# Patient Record
Sex: MALE | Race: WHITE | NOT HISPANIC OR LATINO | Employment: OTHER | URBAN - METROPOLITAN AREA
[De-identification: names, ages, dates, MRNs, and addresses within clinical notes are randomized per-mention and may not be internally consistent; named-entity substitution may affect disease eponyms.]

---

## 2017-01-10 ENCOUNTER — HOSPITAL ENCOUNTER (OUTPATIENT)
Dept: RADIOLOGY | Facility: HOSPITAL | Age: 72
Discharge: HOME | End: 2017-01-10
Attending: THORACIC SURGERY (CARDIOTHORACIC VASCULAR SURGERY)
Payer: MEDICARE

## 2017-01-10 DIAGNOSIS — I65.29 OCCLUSION AND STENOSIS OF CAROTID ARTERY WITHOUT MENTION OF CEREBRAL INFARCTION: ICD-10-CM

## 2017-01-10 DIAGNOSIS — I71.40 ABDOMINAL AORTIC ANEURYSM WITHOUT RUPTURE: ICD-10-CM

## 2017-01-10 PROCEDURE — 76775 US EXAM ABDO BACK WALL LIM: CPT | Mod: TC,PO

## 2017-01-10 PROCEDURE — 76775 US EXAM ABDO BACK WALL LIM: CPT | Mod: 26,,, | Performed by: RADIOLOGY

## 2017-01-10 PROCEDURE — 93880 EXTRACRANIAL BILAT STUDY: CPT | Mod: TC,PO

## 2017-01-10 PROCEDURE — 93880 EXTRACRANIAL BILAT STUDY: CPT | Mod: 26,,, | Performed by: RADIOLOGY

## 2017-01-24 ENCOUNTER — OFFICE VISIT (OUTPATIENT)
Dept: VASCULAR SURGERY | Facility: CLINIC | Age: 72
End: 2017-01-24
Payer: MEDICARE

## 2017-01-24 VITALS
DIASTOLIC BLOOD PRESSURE: 68 MMHG | BODY MASS INDEX: 25.25 KG/M2 | WEIGHT: 176.38 LBS | HEIGHT: 70 IN | HEART RATE: 57 BPM | SYSTOLIC BLOOD PRESSURE: 139 MMHG

## 2017-01-24 DIAGNOSIS — I65.22 STENOSIS OF LEFT CAROTID ARTERY: ICD-10-CM

## 2017-01-24 DIAGNOSIS — I10 ESSENTIAL HYPERTENSION: ICD-10-CM

## 2017-01-24 DIAGNOSIS — I71.40 ABDOMINAL AORTIC ANEURYSM (AAA) WITHOUT RUPTURE: Primary | ICD-10-CM

## 2017-01-24 PROCEDURE — 1157F ADVNC CARE PLAN IN RCRD: CPT | Mod: S$GLB,,, | Performed by: THORACIC SURGERY (CARDIOTHORACIC VASCULAR SURGERY)

## 2017-01-24 PROCEDURE — 3078F DIAST BP <80 MM HG: CPT | Mod: S$GLB,,, | Performed by: THORACIC SURGERY (CARDIOTHORACIC VASCULAR SURGERY)

## 2017-01-24 PROCEDURE — 99213 OFFICE O/P EST LOW 20 MIN: CPT | Mod: S$GLB,,, | Performed by: THORACIC SURGERY (CARDIOTHORACIC VASCULAR SURGERY)

## 2017-01-24 PROCEDURE — 1159F MED LIST DOCD IN RCRD: CPT | Mod: S$GLB,,, | Performed by: THORACIC SURGERY (CARDIOTHORACIC VASCULAR SURGERY)

## 2017-01-24 PROCEDURE — 99999 PR PBB SHADOW E&M-EST. PATIENT-LVL III: CPT | Mod: PBBFAC,,, | Performed by: THORACIC SURGERY (CARDIOTHORACIC VASCULAR SURGERY)

## 2017-01-24 PROCEDURE — 1160F RVW MEDS BY RX/DR IN RCRD: CPT | Mod: S$GLB,,, | Performed by: THORACIC SURGERY (CARDIOTHORACIC VASCULAR SURGERY)

## 2017-01-24 PROCEDURE — 3075F SYST BP GE 130 - 139MM HG: CPT | Mod: S$GLB,,, | Performed by: THORACIC SURGERY (CARDIOTHORACIC VASCULAR SURGERY)

## 2017-01-24 RX ORDER — OMEPRAZOLE 20 MG/1
20 TABLET, DELAYED RELEASE ORAL
COMMUNITY
End: 2020-02-04

## 2017-01-24 RX ORDER — HYDROCODONE BITARTRATE AND ACETAMINOPHEN 10; 325 MG/1; MG/1
1 TABLET ORAL 2 TIMES DAILY
Refills: 0 | COMMUNITY
Start: 2017-01-03

## 2017-01-24 RX ORDER — DICLOFENAC SODIUM 75 MG/1
TABLET, DELAYED RELEASE ORAL
Refills: 0 | COMMUNITY
Start: 2017-01-20 | End: 2018-02-20

## 2017-01-24 RX ORDER — METOPROLOL SUCCINATE 25 MG/1
25 TABLET, EXTENDED RELEASE ORAL
COMMUNITY
End: 2020-02-04

## 2017-01-24 RX ORDER — BACLOFEN 10 MG/1
TABLET ORAL
Refills: 0 | COMMUNITY
Start: 2017-01-16

## 2017-01-24 NOTE — MR AVS SNAPSHOT
[facility]  [address] [facility] [address]  Phone: [phone]                  Melvin Camargo   2017 11:45 AM   Office Visit    Description:  Male : 1945   Provider:  Julian Deshpande MD   [facility]           Reason for Visit     Carotid Artery Disease     AAA           Diagnoses this Visit        Comments    Abdominal aortic aneurysm (AAA) without rupture    -  Primary     Stenosis of left carotid artery         Essential hypertension                To Do List           Goals (5 Years of Data)     None      Follow-Up and Disposition     Return in about 1 year (around 2018).      [facility]     [facility] Care Line -  Assistance  Registered nurses in the [facility] On Call Center provide clinical advisement, health education, appointment booking, and other advisory services.  Call for this free service at [phone].             Medications           Message regarding Medications     Verify the changes and/or additions to your medication regime listed below are the same as discussed with your clinician today.  If any of these changes or additions are incorrect, please notify your healthcare provider.             Verify that the below list of medications is an accurate representation of the medications you are currently taking.  If none reported, the list may be blank. If incorrect, please contact your healthcare provider. Carry this list with you in case of emergency.           Current Medications     aspirin (ECOTRIN) 81 MG EC tablet Take 81 mg by mouth once daily.      atorvastatin (LIPITOR) 40 MG tablet     baclofen (LIORESAL) 10 MG tablet     cyclobenzaprine (FLEXERIL) 10 MG tablet Take 10 mg by mouth every evening.    diazepam (VALIUM) 10 MG Tab     diclofenac (VOLTAREN) 75 MG EC tablet     etodolac (LODINE) 400 MG tablet Take 400 mg by mouth 2 (two) times daily.    ferrous sulfate 325 mg (65 mg  "iron) Tab tablet     hydrocodone-acetaminophen 10-325mg (NORCO)  mg Tab Take 1 tablet by mouth 2 (two) times daily.    lisinopril (PRINIVIL,ZESTRIL) 40 MG tablet Take 40 mg by mouth once daily.      lorazepam (ATIVAN) 1 MG tablet     metoprolol succinate (TOPROL-XL) 25 MG 24 hr tablet Take 25 mg by mouth.    metoprolol tartrate (LOPRESSOR) 25 MG tablet Take 12.5 mg by mouth 2 (two) times daily.    nifedipine (NIFEDIAC CC) 90 MG TbSR Take 30 mg by mouth once daily.      omeprazole (PRILOSEC OTC) 20 MG tablet Take 20 mg by mouth.    omeprazole (PRILOSEC) 20 MG capsule Take 20 mg by mouth once daily.      quetiapine (SEROQUEL) 25 MG Tab     terazosin (HYTRIN) 5 MG capsule Take 5 mg by mouth every evening.             Clinical Reference Information           Vital Signs - Last Recorded  Most recent update: 1/24/2017 12:06 PM by Aida Guzman MA    BP Pulse Ht Wt BMI    139/68 (!) 57 5' 10" (1.778 m) 80 kg (176 lb 6.4 oz) 25.31 kg/m2      Blood Pressure          Most Recent Value    BP  139/68      Allergies as of 1/24/2017     No Known Allergies      Immunizations Administered on Date of Encounter - 1/24/2017     None      Orders Placed During Today's Visit     Future Labs/Procedures Expected by Expires    US Abdominal Aorta  1/24/2018 8/24/2018    US Carotid Bilateral  1/24/2018 8/24/2018      [facility] Sign-Up     Activating your [facility] account is as easy as 1-2-3!     1) Visit [email or web address], select Sign Up Now, enter this activation code and your date of birth, then select Next.  [email or web address]  Expires: 3/10/2017  4:16 PM      2) Create a username and password to use when you visit [facility] in the future and select a security question in case you lose your password and select Next.    3) Enter your e-mail address and click Sign Up!    Additional Information  If you have questions, please e-mail [email or web address] or call [phone] to talk to our [facility] staff. Remember, [facility] is NOT to be " used for urgent needs. For medical emergencies, dial 911.

## 2017-01-24 NOTE — PROGRESS NOTES
CLINIC NOTE    Mr. Camargo is a 71-year-old white male with history of carotid artery stenosis   and abdominal aortic aneurysm.  He has no lateralizing neurologic complaints and   no amaurosis fugax.  He denies claudication and abdominal pain.  He has had   some left hip pain radiating into the left leg, which was felt by other   physicians be secondary to a problem with the nerve in his lower back.    PAST MEDICAL AND SURGICAL HISTORY:  Reviewed.    MEDICATIONS:  Aspirin, Flexeril, Prinivil, Seroquel, Hytrin, nifedipine.    ALLERGIES:  None.    REVIEW OF SYSTEMS:  As described above.    PHYSICAL EXAMINATION:  VITAL SIGNS:  Blood pressure 139/68, heart rate 57, weight 176.4 kg.  GENERAL:  Well-nourished, well-developed man, in no obvious distress.  HEENT:  Normocephalic, atraumatic.  There is good facial symmetry.  NECK:  Trachea is midline.  There are no carotid bruits.  CHEST:  Lungs are clear bilaterally.  HEART:  Regular rate and rhythm.  No rubs.  No murmurs.  ABDOMEN:  Soft.  Aortic pulsation is noted.  There are no abdominal bruits.    Bowel sounds are normal.  EXTREMITIES:  No cyanosis, clubbing or edema.  VASCULAR:  2+ radial and dorsalis pedis pulses bilaterally.  NEUROLOGIC:  Alert and oriented x4 with no focal deficits.    STUDIES:  Carotid ultrasound from 01/10/2017 reveals approximately 50% left   internal carotid artery stenosis and less than that on the right.  Ultrasound of   the abdominal aorta from 01/10/2017 reveals minimal increase in his aneurysm as   compared to that of April 2014.  It is only increased from 4.3 to 4.5 x 4.7 cm.    IMPRESSION:  1.  Small asymptomatic infrarenal abdominal aortic aneurysm without rupture.  2.  Left internal carotid artery stenosis that is asymptomatic.  3.  Hypertension.    PLAN:  Continue current medical management.  Follow with me in one year with   ultrasound of the abdominal aorta and ultrasound of the carotid arteries.      JOAN/MICHAEL  dd: 01/24/2017 16:14:41  (CST)  td: 01/24/2017 19:42:15 (CST)  Doc ID   #[id number]  Job ID #[id number]    CC:

## 2018-01-25 ENCOUNTER — TELEPHONE (OUTPATIENT)
Dept: VASCULAR SURGERY | Facility: CLINIC | Age: 73
End: 2018-01-25

## 2018-01-25 NOTE — TELEPHONE ENCOUNTER
----- Message from Faith Celaya sent at 1/25/2018  3:58 PM CST -----  Contact: Melvin  Patient is calling as received letter to schedule test and appointment. Please call [phone] and if calling tomorrow will not be available until after 1. Thanks!

## 2018-02-15 ENCOUNTER — TELEPHONE (OUTPATIENT)
Dept: RADIOLOGY | Facility: HOSPITAL | Age: 73
End: 2018-02-15

## 2018-02-16 ENCOUNTER — HOSPITAL ENCOUNTER (OUTPATIENT)
Dept: RADIOLOGY | Facility: HOSPITAL | Age: 73
Discharge: HOME | End: 2018-02-16
Attending: THORACIC SURGERY (CARDIOTHORACIC VASCULAR SURGERY)
Payer: MEDICARE

## 2018-02-16 DIAGNOSIS — I10 ESSENTIAL HYPERTENSION: ICD-10-CM

## 2018-02-16 DIAGNOSIS — I65.22 STENOSIS OF LEFT CAROTID ARTERY: ICD-10-CM

## 2018-02-16 DIAGNOSIS — I71.40 ABDOMINAL AORTIC ANEURYSM (AAA) WITHOUT RUPTURE: ICD-10-CM

## 2018-02-16 PROCEDURE — 76775 US EXAM ABDO BACK WALL LIM: CPT | Mod: 26,,, | Performed by: RADIOLOGY

## 2018-02-16 PROCEDURE — 76775 US EXAM ABDO BACK WALL LIM: CPT | Mod: TC,PO

## 2018-02-16 PROCEDURE — 93880 EXTRACRANIAL BILAT STUDY: CPT | Mod: 26,,, | Performed by: RADIOLOGY

## 2018-02-16 PROCEDURE — 93880 EXTRACRANIAL BILAT STUDY: CPT | Mod: TC,PO

## 2018-02-20 ENCOUNTER — OFFICE VISIT (OUTPATIENT)
Dept: VASCULAR SURGERY | Facility: CLINIC | Age: 73
End: 2018-02-20
Payer: MEDICARE

## 2018-02-20 VITALS
BODY MASS INDEX: 25.5 KG/M2 | WEIGHT: 178.13 LBS | SYSTOLIC BLOOD PRESSURE: 144 MMHG | DIASTOLIC BLOOD PRESSURE: 67 MMHG | HEART RATE: 65 BPM | HEIGHT: 70 IN

## 2018-02-20 DIAGNOSIS — I71.40 ABDOMINAL AORTIC ANEURYSM (AAA) WITHOUT RUPTURE: Primary | ICD-10-CM

## 2018-02-20 DIAGNOSIS — I65.22 STENOSIS OF LEFT CAROTID ARTERY: ICD-10-CM

## 2018-02-20 PROCEDURE — 3008F BODY MASS INDEX DOCD: CPT | Mod: S$GLB,,, | Performed by: THORACIC SURGERY (CARDIOTHORACIC VASCULAR SURGERY)

## 2018-02-20 PROCEDURE — 1159F MED LIST DOCD IN RCRD: CPT | Mod: S$GLB,,, | Performed by: THORACIC SURGERY (CARDIOTHORACIC VASCULAR SURGERY)

## 2018-02-20 PROCEDURE — 99213 OFFICE O/P EST LOW 20 MIN: CPT | Mod: S$GLB,,, | Performed by: THORACIC SURGERY (CARDIOTHORACIC VASCULAR SURGERY)

## 2018-02-20 PROCEDURE — 1125F AMNT PAIN NOTED PAIN PRSNT: CPT | Mod: S$GLB,,, | Performed by: THORACIC SURGERY (CARDIOTHORACIC VASCULAR SURGERY)

## 2018-02-20 PROCEDURE — 99999 PR PBB SHADOW E&M-EST. PATIENT-LVL III: CPT | Mod: PBBFAC,,, | Performed by: THORACIC SURGERY (CARDIOTHORACIC VASCULAR SURGERY)

## 2018-02-20 RX ORDER — PAROXETINE 10 MG/1
TABLET, FILM COATED ORAL
COMMUNITY
Start: 2018-01-18 | End: 2020-02-04

## 2018-02-20 RX ORDER — PREGABALIN 75 MG/1
75 CAPSULE ORAL 2 TIMES DAILY
COMMUNITY
End: 2020-02-04

## 2018-02-20 RX ORDER — LEUPROLIDE ACETATE 22.5 MG
KIT INTRAMUSCULAR
COMMUNITY
Start: 2018-02-19 | End: 2020-02-04

## 2018-02-20 NOTE — PROGRESS NOTES
CLINIC NOTE    HISTORY OF PRESENT ILLNESS:  Mr. Camargo is a 72-year-old gentleman with   asymptomatic carotid artery stenosis and asymptomatic infrarenal abdominal   aortic aneurysm.  He has had no lateralizing neurologic deficits aside from   lower back nerve related pains in the left leg.  He also has had no symptoms   from his abdominal aorta.  He has undergone back surgery since his last visit   with me.    MEDICATIONS:  Reviewed and include aspirin daily.    ALLERGIES:  None.    REVIEW OF SYSTEMS:  Negative for lateralizing neurologic complaints, amaurosis   fugax, or significant abdominal pain or claudication.    PHYSICAL EXAMINATION:  VITAL SIGNS:  Blood pressure 144/67, heart rate 65, weight 80.8 kg.  GENERAL:  He appears well.  CHEST:  Lungs are clear bilaterally.  HEART:  Regular rate and rhythm.  ABDOMEN:  The pulsatility of the aorta is noted.  There is no bruit.  EXTREMITIES:  No edema.  VASCULAR:  2+ radial, femoral, and popliteal pulses bilaterally.  NECK:  Carotid arteries are without bruits.  NEUROLOGIC:  Alert and oriented x4 with no focal deficits.  Tongue protrudes in   midline.    STUDIES:  Carotid ultrasound from 02/16/2018 reveals on the right a peak   systolic velocity of 116 with a ratio of 1.3 and on the left the velocity of 150   with a ratio of 1.4.  This suggests a 50-69% carotid stenosis, which is   unchanged from previous findings.  Ultrasound of the abdominal aorta from   02/16/2018 is compared to that of 01/10/2017.  There is a 4.5 x 4.7 cm   infrarenal abdominal aortic aneurysm that is unchanged from the previous year.    IMPRESSION:  1.  Bilateral internal carotid artery stenosis, left greater than right with no   symptoms.  2.  Infrarenal abdominal aortic aneurysm without rupture and without increase in   size.    PLAN:  Continue current medical management.  Follow with me in one year with   carotid ultrasound and ultrasound of the abdominal aorta.      JOAN/ARTURO  dd: 02/20/2018  10:22:58 (CST)  td: 02/21/2018 00:25:14 (NITIN)  Doc ID   #[id number]  Job ID #[id number]    CC:

## 2019-03-05 ENCOUNTER — HOSPITAL ENCOUNTER (OUTPATIENT)
Dept: RADIOLOGY | Facility: HOSPITAL | Age: 74
Discharge: HOME | End: 2019-03-05
Attending: THORACIC SURGERY (CARDIOTHORACIC VASCULAR SURGERY)
Payer: MEDICARE

## 2019-03-05 DIAGNOSIS — I71.40 ABDOMINAL AORTIC ANEURYSM (AAA) WITHOUT RUPTURE: ICD-10-CM

## 2019-03-05 DIAGNOSIS — I65.22 STENOSIS OF LEFT CAROTID ARTERY: ICD-10-CM

## 2019-03-05 PROCEDURE — 93880 US CAROTID BILATERAL: ICD-10-PCS | Mod: 26,,, | Performed by: RADIOLOGY

## 2019-03-05 PROCEDURE — 76775 US ABDOMINAL AORTA: ICD-10-PCS | Mod: 26,,, | Performed by: RADIOLOGY

## 2019-03-05 PROCEDURE — 93880 EXTRACRANIAL BILAT STUDY: CPT | Mod: TC,PO

## 2019-03-05 PROCEDURE — 76775 US EXAM ABDO BACK WALL LIM: CPT | Mod: TC,PO

## 2019-03-05 PROCEDURE — 93880 EXTRACRANIAL BILAT STUDY: CPT | Mod: 26,,, | Performed by: RADIOLOGY

## 2019-03-05 PROCEDURE — 76775 US EXAM ABDO BACK WALL LIM: CPT | Mod: 26,,, | Performed by: RADIOLOGY

## 2019-03-19 ENCOUNTER — OFFICE VISIT (OUTPATIENT)
Dept: VASCULAR SURGERY | Facility: CLINIC | Age: 74
End: 2019-03-19
Payer: MEDICARE

## 2019-03-19 VITALS
SYSTOLIC BLOOD PRESSURE: 151 MMHG | HEART RATE: 73 BPM | BODY MASS INDEX: 26.48 KG/M2 | WEIGHT: 185 LBS | HEIGHT: 70 IN | DIASTOLIC BLOOD PRESSURE: 71 MMHG | RESPIRATION RATE: 18 BRPM

## 2019-03-19 DIAGNOSIS — I71.40 ABDOMINAL AORTIC ANEURYSM (AAA) WITHOUT RUPTURE: Primary | ICD-10-CM

## 2019-03-19 DIAGNOSIS — I10 ESSENTIAL HYPERTENSION: ICD-10-CM

## 2019-03-19 PROCEDURE — 3288F PR FALLS RISK ASSESSMENT DOCUMENTED: ICD-10-PCS | Mod: CPTII,S$GLB,, | Performed by: THORACIC SURGERY (CARDIOTHORACIC VASCULAR SURGERY)

## 2019-03-19 PROCEDURE — 3078F PR MOST RECENT DIASTOLIC BLOOD PRESSURE < 80 MM HG: ICD-10-PCS | Mod: CPTII,S$GLB,, | Performed by: THORACIC SURGERY (CARDIOTHORACIC VASCULAR SURGERY)

## 2019-03-19 PROCEDURE — 99214 PR OFFICE/OUTPT VISIT, EST, LEVL IV, 30-39 MIN: ICD-10-PCS | Mod: S$GLB,,, | Performed by: THORACIC SURGERY (CARDIOTHORACIC VASCULAR SURGERY)

## 2019-03-19 PROCEDURE — 3077F SYST BP >= 140 MM HG: CPT | Mod: CPTII,S$GLB,, | Performed by: THORACIC SURGERY (CARDIOTHORACIC VASCULAR SURGERY)

## 2019-03-19 PROCEDURE — 3078F DIAST BP <80 MM HG: CPT | Mod: CPTII,S$GLB,, | Performed by: THORACIC SURGERY (CARDIOTHORACIC VASCULAR SURGERY)

## 2019-03-19 PROCEDURE — 3077F PR MOST RECENT SYSTOLIC BLOOD PRESSURE >= 140 MM HG: ICD-10-PCS | Mod: CPTII,S$GLB,, | Performed by: THORACIC SURGERY (CARDIOTHORACIC VASCULAR SURGERY)

## 2019-03-19 PROCEDURE — 99999 PR PBB SHADOW E&M-EST. PATIENT-LVL III: ICD-10-PCS | Mod: PBBFAC,,, | Performed by: THORACIC SURGERY (CARDIOTHORACIC VASCULAR SURGERY)

## 2019-03-19 PROCEDURE — 99214 OFFICE O/P EST MOD 30 MIN: CPT | Mod: S$GLB,,, | Performed by: THORACIC SURGERY (CARDIOTHORACIC VASCULAR SURGERY)

## 2019-03-19 PROCEDURE — 3288F FALL RISK ASSESSMENT DOCD: CPT | Mod: CPTII,S$GLB,, | Performed by: THORACIC SURGERY (CARDIOTHORACIC VASCULAR SURGERY)

## 2019-03-19 PROCEDURE — 1100F PTFALLS ASSESS-DOCD GE2>/YR: CPT | Mod: CPTII,S$GLB,, | Performed by: THORACIC SURGERY (CARDIOTHORACIC VASCULAR SURGERY)

## 2019-03-19 PROCEDURE — 1100F PR PT FALLS ASSESS DOC 2+ FALLS/FALL W/INJURY/YR: ICD-10-PCS | Mod: CPTII,S$GLB,, | Performed by: THORACIC SURGERY (CARDIOTHORACIC VASCULAR SURGERY)

## 2019-03-19 PROCEDURE — 99999 PR PBB SHADOW E&M-EST. PATIENT-LVL III: CPT | Mod: PBBFAC,,, | Performed by: THORACIC SURGERY (CARDIOTHORACIC VASCULAR SURGERY)

## 2019-03-19 RX ORDER — AMLODIPINE BESYLATE 5 MG/1
5 TABLET ORAL DAILY
COMMUNITY
End: 2020-02-04

## 2019-03-19 RX ORDER — LEVOTHYROXINE SODIUM 50 UG/1
50 TABLET ORAL DAILY
COMMUNITY

## 2019-03-19 NOTE — PROGRESS NOTES
Clinic note:  03/19/2019    CHIEF COMPLAINT:   Chief Complaint   Patient presents with    Carotid Artery Disease    Aortic Aneurysm       Subjective:  History of present illness     Patient is a 73 y.o. male with a history of asymptomatic carotid artery stenosis an asymptomatic infrarenal abdominal aortic aneurysm.  He has been followed with ultrasounds for both of these.  Currently he denies amaurosis fugax, lateralizing neurologic complaints, abdominal pain, and claudication.  His only major complaint is ongoing back discomfort related to the site of his previous back surgery. He has undergone CT scan of the abdomen with contrast today, and he states that there are plans for an MRI of his back and bone scan.    Patient Active Problem List    Diagnosis Date Noted    Prostate cancer 05/05/2015    HTN (hypertension) 05/05/2015    Cervical spine disease 10/23/2012    Carotid artery stenosis 10/23/2012    AAA (abdominal aortic aneurysm) 10/23/2012     Past Medical History:   Diagnosis Date    AAA (abdominal aortic aneurysm)     Cancer     prostate    Carotid artery occlusion     Cervical spine disease     HTN (hypertension) 5/5/2015    Hyperlipidemia     Hypertension     Prostate cancer 5/5/2015      Past Surgical History:   Procedure Laterality Date    BACK SURGERY  05/2017    TONSILLECTOMY, ADENOIDECTOMY        Medication List with Changes/Refills   Current Medications    AMLODIPINE (NORVASC) 5 MG TABLET    Take 5 mg by mouth once daily.    ASPIRIN (ECOTRIN) 81 MG EC TABLET    Take 81 mg by mouth once daily.      ATORVASTATIN (LIPITOR) 40 MG TABLET        BACLOFEN (LIORESAL) 10 MG TABLET        DIAZEPAM (VALIUM) 10 MG TAB        FERROUS SULFATE 325 MG (65 MG IRON) TAB TABLET        HYDROCODONE-ACETAMINOPHEN 10-325MG (NORCO)  MG TAB    Take 1 tablet by mouth 2 (two) times daily.    LEVOTHYROXINE (SYNTHROID) 50 MCG TABLET    Take 50 mcg by mouth once daily.    LORAZEPAM (ATIVAN) 1 MG TABLET      "   LUPRON DEPOT, 3 MONTH, 22.5 MG INJECTION        METOPROLOL SUCCINATE (TOPROL-XL) 25 MG 24 HR TABLET    Take 25 mg by mouth.    OMEPRAZOLE (PRILOSEC OTC) 20 MG TABLET    Take 20 mg by mouth.    PAROXETINE (PAXIL) 10 MG TABLET        PREGABALIN (LYRICA) 75 MG CAPSULE    Take 75 mg by mouth 2 (two) times daily.    QUETIAPINE (SEROQUEL) 25 MG TAB        TERAZOSIN (HYTRIN) 5 MG CAPSULE    Take 5 mg by mouth every evening.       Review of patient's allergies indicates:  No Known Allergies   Social History     Tobacco Use    Smoking status: Former Smoker     Last attempt to quit: 10/22/2012     Years since quittin.4   Substance Use Topics    Alcohol use: Yes     Comment: Occasional beer      Family History   Problem Relation Age of Onset    Heart disease Father       Review of Systems   General:  No fevers, chills, night sweats.  HEENT:  No new visual field changes.  No amaurosis fugax.  Neck:  No complaints.  Respiratory:  No significant shortness of breath.  Cardiac:  No angina, orthopnea, PND.  GI:  No constipation or melena.  :  No dysuria.  Neurologic:  No lateralizing complaints.  Musculoskeletal:  On going progression of back pain.  Vascular:  No claudication    Objective:  Physical exam     Vitals:    19 1143   BP: (!) 151/71   Pulse: 73   Resp: 18   Weight: 83.9 kg (185 lb)   Height: 5' 10" (1.778 m)   PainSc: 0-No pain     General:  Well-nourished, well-developed man in no obvious distress.  HEENT:  Normocephalic, atraumatic.  There is good facial symmetry.  Neck:  Trachea is midline.  There are no carotid bruits.  Chest:  Lungs are clear bilaterally.  Heart:  Regular rate and rhythm with no rubs or murmurs.  Abdomen:  Aortic pulsatility is noted.  There is no bruit.  Bowel sounds are normal.  Extremities:  No cyanosis, clubbing, edema.  Vascular:  2+ radial, femoral, and popliteal pulses bilaterally.  Neurologic:  Alert and oriented x4 no focal deficits.    Data Review:       US ABDOMINAL AORTA " 03/05/2019    CLINICAL HISTORY:  Abdominal aortic aneurysm, without rupture    TECHNIQUE:  Limited ultrasound was performed of the abdominal aorta, with cross sectional diameter measurements obtained.    COMPARISON:  The previous sonogram from 02/16/2018.    FINDINGS:  The following measurements are given AP by transverse.    The proximal abdominal aorta could not be visualized secondary to shadowing artifact.    The mid abdominal aorta measures 2.1 x 2.2 cm.    The distal abdominal aorta measures 4.8 x 5.2 cm.  Mural thrombus is again noted along its margin. On the previous exam, the aneurysm is remeasured by me at approximately 4.7 x 5.0 cm.  The sagittal length measures 6.4 cm.    The right iliac artery measures 1.4 cm.  Velocity measures 311 centimeters/second    The left iliac artery measures 1.4 cm.  Velocity measures 306 centimeters/second.    Aortoiliac atherosclerosis: Visualized      Impression       Redemonstration of an infrarenal abdominal aortic aneurysm.  The maximum dimension is 5.2 cm.  On the prior exam, I remeasure the maximum dimension at 5 cm.      Electronically signed by: Earl Calvert MD  Date: 03/05/2019  Time: 09:45        US CAROTID BILATERAL 03/05/2019    CLINICAL HISTORY:  Occlusion and stenosis of left carotid artery    TECHNIQUE:  Grayscale and color Doppler ultrasound examination of the carotid and vertebral artery systems bilaterally.  Stenosis estimates are per the NASCET measurement criteria.    COMPARISON:  Carotid ultrasound performed on 02/16/2018..    FINDINGS:  Right:    Internal Carotid Artery (ICA) peak systolic velocity 83 cm/sec    ICA/CCA peak systolic velocity ratio: 1.1    Plaque formation: Intimal thickening noted within the common carotid artery with calcified plaque noted in the bulb.    Vertebral artery: Antegrade flow and normal waveform.    Left:    Internal Carotid Artery (ICA)  peak systolic velocity 127 cm/sec.    ICA/CCA peak systolic velocity ratio:  1.2    Plaque formation: Moderate calcified plaque noted within the proximal left ICA.    Vertebral artery: Antegrade flow and normal waveform.      Impression       Essentially stable exam since 02/16/2018.  Findings are again suggestive of left internal carotid artery stenosis in the 50-69% range.  No significant stenosis in the contralateral right internal carotid artery.      Electronically signed by: Earl Calvert MD  Date: 03/05/2019  Time: 09:26         Assessment:     1.  Infrarenal abdominal aortic aneurysm increasing in size at 5.2 cm.  2.  Mild carotid artery disease without significant stenosis.  3.  Essential hypertension treated medically.  4.  Chronic back problems related to the degenerative bone disease.    Plan:     I will likely recommend patient be considered for repair of his infrarenal abdominal aortic aneurysm.  I will obtain the images from his abdominal CT scan that was performed earlier today at an outside facility and make decisions based off of the findings of that study.

## 2019-04-16 ENCOUNTER — TELEPHONE (OUTPATIENT)
Dept: VASCULAR SURGERY | Facility: CLINIC | Age: 74
End: 2019-04-16

## 2019-04-16 NOTE — TELEPHONE ENCOUNTER
----- Message from Joanne Almendarez sent at 4/16/2019  1:48 PM CDT -----  Contact: self [phone]  Pt would like return call from nurse regarding scheduling surgery. Please call back at [phone].  Md Navin

## 2019-04-17 NOTE — TELEPHONE ENCOUNTER
----- Message from Mel Guerrier sent at 4/17/2019  9:32 AM CDT -----  Contact: self  Type:  Patient Returning Call    Who Called:  self  Who Left Message for Patient:  Aida  Does the patient know what this is regarding?:  yes  Best Call Back Number:  [phone]  Additional Information:  Patient returning call from yesterday regarding the aneurism. Thanks!

## 2019-04-30 ENCOUNTER — TELEPHONE (OUTPATIENT)
Dept: VASCULAR SURGERY | Facility: CLINIC | Age: 74
End: 2019-04-30

## 2019-04-30 NOTE — TELEPHONE ENCOUNTER
Spoke with Angelica at the [facility]. She was calling to notify Dr. Omalley that Mr. Camargo has a positive antibody screen which was identified during the type and match for 2 units PRBCs. She states that they should be able to get matching units by surgery time. If not she will notify Dr. Staton prior to start time.

## 2019-04-30 NOTE — TELEPHONE ENCOUNTER
----- Message from Stevie Cota sent at 4/30/2019 12:56 PM CDT -----  Contact: Angelica with blood bank in [facility]  Angelica is requesting a call back to discuss the pt's blood  For the infusion.    Call Back #: [phone]  Thanks

## 2019-04-30 NOTE — TELEPHONE ENCOUNTER
----- Message from Jose Kumar sent at 4/30/2019  2:14 PM CDT -----  Contact: [facility]/Angelica in the Lab/Blood[facility]  Angelica called in regarding the attached patient.  Angelica wanted to know if patient had any blood bank history or admitted to any other hospitals in the area within the last 3 months?  Or any type of transfusion history?    Angelica's call back number is [phone]      Angelica called back to find out if there is any history of transfusion or hospitalization within last 3 months.   No recorded history in chart

## 2019-05-01 ENCOUNTER — OUTSIDE PLACE OF SERVICE (OUTPATIENT)
Dept: ADMINISTRATIVE | Facility: OTHER | Age: 74
End: 2019-05-01
Payer: MEDICARE

## 2019-05-01 ENCOUNTER — OUTSIDE PLACE OF SERVICE (OUTPATIENT)
Dept: ADMINISTRATIVE | Facility: OTHER | Age: 74
End: 2019-05-01

## 2019-05-01 PROCEDURE — 34705 EVAC RPR A-BIILIAC NDGFT: CPT | Mod: AS,,, | Performed by: NURSE PRACTITIONER

## 2019-05-01 PROCEDURE — 34812 PR AAA REPR,EXPOSE FEMORAL ART,GROIN INCIS: ICD-10-PCS | Mod: 50,,, | Performed by: THORACIC SURGERY (CARDIOTHORACIC VASCULAR SURGERY)

## 2019-05-01 PROCEDURE — 34812 PR AAA REPR,EXPOSE FEMORAL ART,GROIN INCIS: ICD-10-PCS | Mod: 50,AS,, | Performed by: NURSE PRACTITIONER

## 2019-05-01 PROCEDURE — 34705 PR REPAIR, ENDOVASC, AORTO-BI-ILIAC ENDOGRAFT: ICD-10-PCS | Mod: AS,,, | Performed by: NURSE PRACTITIONER

## 2019-05-01 PROCEDURE — 34705 PR REPAIR, ENDOVASC, AORTO-BI-ILIAC ENDOGRAFT: ICD-10-PCS | Mod: ,,, | Performed by: THORACIC SURGERY (CARDIOTHORACIC VASCULAR SURGERY)

## 2019-05-01 PROCEDURE — 34812 OPN FEM ART EXPOS: CPT | Mod: 50,,, | Performed by: THORACIC SURGERY (CARDIOTHORACIC VASCULAR SURGERY)

## 2019-05-01 PROCEDURE — 34705 EVAC RPR A-BIILIAC NDGFT: CPT | Mod: ,,, | Performed by: THORACIC SURGERY (CARDIOTHORACIC VASCULAR SURGERY)

## 2019-05-01 PROCEDURE — 34812 OPN FEM ART EXPOS: CPT | Mod: 50,AS,, | Performed by: NURSE PRACTITIONER

## 2019-05-06 ENCOUNTER — TELEPHONE (OUTPATIENT)
Dept: VASCULAR SURGERY | Facility: CLINIC | Age: 74
End: 2019-05-06

## 2019-05-06 NOTE — TELEPHONE ENCOUNTER
----- Message from Sara Gustafson sent at 5/6/2019 10:45 AM CDT -----  Contact: [phone]  Patient is requesting a call back from the nurse stated he had surgery last week and may be having some issues.  Please call the patient upon request at phone number [phone].

## 2019-05-06 NOTE — TELEPHONE ENCOUNTER
Mr. Camargo had questions concerning the surgical bandages. I told him that they could be removed and he could shower. Dry incisions well. He also states that he has been taking Tylenol for pain and it has not been helping. He says that he did not receive any pain medication on discharge. I offered to get a prescription for him but he stated that he has some at home from a  previous procedure. If he needs more he will notify us.

## 2019-05-28 ENCOUNTER — OFFICE VISIT (OUTPATIENT)
Dept: VASCULAR SURGERY | Facility: CLINIC | Age: 74
End: 2019-05-28
Payer: MEDICARE

## 2019-05-28 VITALS
BODY MASS INDEX: 25.2 KG/M2 | HEIGHT: 70 IN | WEIGHT: 176 LBS | DIASTOLIC BLOOD PRESSURE: 62 MMHG | RESPIRATION RATE: 18 BRPM | SYSTOLIC BLOOD PRESSURE: 124 MMHG | HEART RATE: 68 BPM

## 2019-05-28 DIAGNOSIS — I71.40 ABDOMINAL AORTIC ANEURYSM WITHOUT RUPTURE: Primary | ICD-10-CM

## 2019-05-28 PROCEDURE — 99024 POSTOP FOLLOW-UP VISIT: CPT | Mod: S$GLB,,, | Performed by: THORACIC SURGERY (CARDIOTHORACIC VASCULAR SURGERY)

## 2019-05-28 PROCEDURE — 99999 PR PBB SHADOW E&M-EST. PATIENT-LVL III: CPT | Mod: PBBFAC,,, | Performed by: THORACIC SURGERY (CARDIOTHORACIC VASCULAR SURGERY)

## 2019-05-28 PROCEDURE — 99999 PR PBB SHADOW E&M-EST. PATIENT-LVL III: ICD-10-PCS | Mod: PBBFAC,,, | Performed by: THORACIC SURGERY (CARDIOTHORACIC VASCULAR SURGERY)

## 2019-05-28 PROCEDURE — 99024 PR POST-OP FOLLOW-UP VISIT: ICD-10-PCS | Mod: S$GLB,,, | Performed by: THORACIC SURGERY (CARDIOTHORACIC VASCULAR SURGERY)

## 2019-05-28 RX ORDER — CODEINE PHOSPHATE AND GUAIFENESIN 10; 100 MG/5ML; MG/5ML
5 SOLUTION ORAL 3 TIMES DAILY PRN
COMMUNITY
End: 2020-02-04

## 2019-05-28 RX ORDER — TEMAZEPAM 7.5 MG/1
15 CAPSULE ORAL NIGHTLY PRN
COMMUNITY
End: 2020-02-04

## 2019-05-28 RX ORDER — ALBUTEROL SULFATE 0.63 MG/3ML
0.63 SOLUTION RESPIRATORY (INHALATION) EVERY 6 HOURS PRN
COMMUNITY

## 2019-05-28 RX ORDER — AZELASTINE 1 MG/ML
1 SPRAY, METERED NASAL 2 TIMES DAILY
COMMUNITY
End: 2020-02-04

## 2019-05-28 RX ORDER — IBUPROFEN 200 MG
1 CAPSULE ORAL DAILY
COMMUNITY
End: 2020-02-04

## 2019-05-28 RX ORDER — MONTELUKAST SODIUM 10 MG/1
10 TABLET ORAL NIGHTLY
COMMUNITY
End: 2020-02-04

## 2019-05-28 NOTE — PROGRESS NOTES
"Subjective:       Melvin Camargo presents to the clinic  After undergoing endograft repair of infrarenal abdominal aortic aneurysm at [facility] on 05/01/2019. his surgical intervention went without difficulty.  His only current complaint is some burning in the medial thighs which is almost resolved on the left side.       Objective:      /62   Pulse 68   Resp 18   Ht 5' 10" (1.778 m)   Wt 79.8 kg (176 lb)   BMI 25.25 kg/m²     Objective    General: He appears well.    Abdomen:  Soft.  No abnormal bruit.  No abnormal pulsatility.    Extremities:  Both groin incisions are healing well.  There is slight subcutaneous thickening on the right side.    Vascular:  2+ femoral and popliteal pulses bilaterally.       Assessment:      Doing well postoperatively.      Plan:      1. Continue any current medications.  2.   I would expect that the burning in both thighs should resolve over the upcoming weeks.  If not, he will notify me.  3. Pt is to increase activities as tolerated.  4. Follow up: 6 months with CT angiogram of the abdomen and pelvis    "

## 2020-01-08 ENCOUNTER — HOSPITAL ENCOUNTER (OUTPATIENT)
Dept: RADIOLOGY | Facility: HOSPITAL | Age: 75
Discharge: HOME | End: 2020-01-08
Attending: THORACIC SURGERY (CARDIOTHORACIC VASCULAR SURGERY)
Payer: MEDICARE

## 2020-01-08 DIAGNOSIS — I71.40 ABDOMINAL AORTIC ANEURYSM WITHOUT RUPTURE: ICD-10-CM

## 2020-01-08 PROCEDURE — 25500020 PHARM REV CODE 255: Mod: PO | Performed by: THORACIC SURGERY (CARDIOTHORACIC VASCULAR SURGERY)

## 2020-01-08 PROCEDURE — 74174 CTA ABD&PLVS W/CONTRAST: CPT | Mod: 26,,, | Performed by: RADIOLOGY

## 2020-01-08 PROCEDURE — 74174 CTA ABDOMEN AND PELVIS: ICD-10-PCS | Mod: 26,,, | Performed by: RADIOLOGY

## 2020-01-08 PROCEDURE — 74174 CTA ABD&PLVS W/CONTRAST: CPT | Mod: TC,PO

## 2020-01-08 RX ADMIN — IOHEXOL 100 ML: 350 INJECTION, SOLUTION INTRAVENOUS at 11:01

## 2020-02-04 ENCOUNTER — OFFICE VISIT (OUTPATIENT)
Dept: CARDIAC SURGERY | Facility: CLINIC | Age: 75
End: 2020-02-04
Payer: MEDICARE

## 2020-02-04 VITALS
HEIGHT: 70 IN | WEIGHT: 176.63 LBS | DIASTOLIC BLOOD PRESSURE: 74 MMHG | BODY MASS INDEX: 25.29 KG/M2 | SYSTOLIC BLOOD PRESSURE: 159 MMHG | HEART RATE: 70 BPM

## 2020-02-04 DIAGNOSIS — I71.40 ABDOMINAL AORTIC ANEURYSM (AAA) WITHOUT RUPTURE: Primary | ICD-10-CM

## 2020-02-04 DIAGNOSIS — Z95.828 HISTORY OF ENDOVASCULAR STENT GRAFT FOR ABDOMINAL AORTIC ANEURYSM: ICD-10-CM

## 2020-02-04 DIAGNOSIS — R91.8 LUNG NODULES: ICD-10-CM

## 2020-02-04 PROCEDURE — 99214 PR OFFICE/OUTPT VISIT, EST, LEVL IV, 30-39 MIN: ICD-10-PCS | Mod: S$GLB,,, | Performed by: THORACIC SURGERY (CARDIOTHORACIC VASCULAR SURGERY)

## 2020-02-04 PROCEDURE — 1159F MED LIST DOCD IN RCRD: CPT | Mod: S$GLB,,, | Performed by: THORACIC SURGERY (CARDIOTHORACIC VASCULAR SURGERY)

## 2020-02-04 PROCEDURE — 99999 PR PBB SHADOW E&M-EST. PATIENT-LVL III: ICD-10-PCS | Mod: PBBFAC,,, | Performed by: THORACIC SURGERY (CARDIOTHORACIC VASCULAR SURGERY)

## 2020-02-04 PROCEDURE — 3077F PR MOST RECENT SYSTOLIC BLOOD PRESSURE >= 140 MM HG: ICD-10-PCS | Mod: CPTII,S$GLB,, | Performed by: THORACIC SURGERY (CARDIOTHORACIC VASCULAR SURGERY)

## 2020-02-04 PROCEDURE — 99999 PR PBB SHADOW E&M-EST. PATIENT-LVL III: CPT | Mod: PBBFAC,,, | Performed by: THORACIC SURGERY (CARDIOTHORACIC VASCULAR SURGERY)

## 2020-02-04 PROCEDURE — 1159F PR MEDICATION LIST DOCUMENTED IN MEDICAL RECORD: ICD-10-PCS | Mod: S$GLB,,, | Performed by: THORACIC SURGERY (CARDIOTHORACIC VASCULAR SURGERY)

## 2020-02-04 PROCEDURE — 3078F DIAST BP <80 MM HG: CPT | Mod: CPTII,S$GLB,, | Performed by: THORACIC SURGERY (CARDIOTHORACIC VASCULAR SURGERY)

## 2020-02-04 PROCEDURE — 99214 OFFICE O/P EST MOD 30 MIN: CPT | Mod: S$GLB,,, | Performed by: THORACIC SURGERY (CARDIOTHORACIC VASCULAR SURGERY)

## 2020-02-04 PROCEDURE — 3078F PR MOST RECENT DIASTOLIC BLOOD PRESSURE < 80 MM HG: ICD-10-PCS | Mod: CPTII,S$GLB,, | Performed by: THORACIC SURGERY (CARDIOTHORACIC VASCULAR SURGERY)

## 2020-02-04 PROCEDURE — 1101F PR PT FALLS ASSESS DOC 0-1 FALLS W/OUT INJ PAST YR: ICD-10-PCS | Mod: CPTII,S$GLB,, | Performed by: THORACIC SURGERY (CARDIOTHORACIC VASCULAR SURGERY)

## 2020-02-04 PROCEDURE — 1125F AMNT PAIN NOTED PAIN PRSNT: CPT | Mod: S$GLB,,, | Performed by: THORACIC SURGERY (CARDIOTHORACIC VASCULAR SURGERY)

## 2020-02-04 PROCEDURE — 1125F PR PAIN SEVERITY QUANTIFIED, PAIN PRESENT: ICD-10-PCS | Mod: S$GLB,,, | Performed by: THORACIC SURGERY (CARDIOTHORACIC VASCULAR SURGERY)

## 2020-02-04 PROCEDURE — 3077F SYST BP >= 140 MM HG: CPT | Mod: CPTII,S$GLB,, | Performed by: THORACIC SURGERY (CARDIOTHORACIC VASCULAR SURGERY)

## 2020-02-04 PROCEDURE — 1101F PT FALLS ASSESS-DOCD LE1/YR: CPT | Mod: CPTII,S$GLB,, | Performed by: THORACIC SURGERY (CARDIOTHORACIC VASCULAR SURGERY)

## 2020-02-04 RX ORDER — QUETIAPINE FUMARATE 50 MG/1
TABLET, FILM COATED ORAL
COMMUNITY
Start: 2020-02-01

## 2020-02-04 RX ORDER — AMLODIPINE BESYLATE 10 MG/1
TABLET ORAL
COMMUNITY

## 2020-02-04 RX ORDER — TRAZODONE HYDROCHLORIDE 50 MG/1
TABLET ORAL
COMMUNITY
Start: 2019-12-11

## 2020-02-04 RX ORDER — OMEPRAZOLE 20 MG/1
CAPSULE, DELAYED RELEASE ORAL
COMMUNITY
Start: 2020-01-19

## 2020-02-05 NOTE — PROGRESS NOTES
CHIEF COMPLAINT:   Chief Complaint   Patient presents with    AAA     F/U CT         History of Present Illness     Patient is a 74 y.o. male with a history of endograft repair infrarenal abdominal aortic aneurysm at [facility] on 05/01/2019.  He denies abdominal pain and leg claudication.  He remains with some intermittent burning discomfort in the upper medial left thigh.    Patient Active Problem List    Diagnosis Date Noted    Prostate cancer 05/05/2015    HTN (hypertension) 05/05/2015    Cervical spine disease 10/23/2012    Carotid artery stenosis 10/23/2012    Abdominal aortic aneurysm (AAA) without rupture 10/23/2012     Past Medical History:   Diagnosis Date    AAA (abdominal aortic aneurysm)     Cancer     prostate    Carotid artery occlusion     Cervical spine disease     HTN (hypertension) 5/5/2015    Hyperlipidemia     Hypertension     Prostate cancer 5/5/2015      Past Surgical History:   Procedure Laterality Date     endograft repair infrarenal abdominal aortic aneurysm  05/01/2019    ABDOMINAL AORTIC ANEURYSM REPAIR  05/01/2019     [place] excluder endograft, [place]    BACK SURGERY  05/2017    TONSILLECTOMY, ADENOIDECTOMY        Medication List with Changes/Refills   Current Medications    ALBUTEROL (ACCUNEB) 0.63 MG/3 ML NEBU    Take 0.63 mg by nebulization every 6 (six) hours as needed. Rescue    AMLODIPINE (NORVASC) 10 MG TABLET    amlodipine 10 mg tablet    ASPIRIN (ECOTRIN) 81 MG EC TABLET    Take 81 mg by mouth once daily.      ATORVASTATIN (LIPITOR) 40 MG TABLET        BACLOFEN (LIORESAL) 10 MG TABLET        HYDROCODONE-ACETAMINOPHEN 10-325MG (NORCO)  MG TAB    Take 1 tablet by mouth 2 (two) times daily.    LEUPROLIDE, 3 MONTH, (ELIGARD, 3 MONTH,) 22.5 MG SYRG SYRINGE    Inject 22.5 mg into the skin.    LEVOTHYROXINE (SYNTHROID) 50 MCG TABLET    Take 50 mcg by mouth once daily.    OMEPRAZOLE (PRILOSEC) 20 MG CAPSULE    TK 1 C PO QD    QUETIAPINE  (SEROQUEL) 50 MG TABLET        TERAZOSIN (HYTRIN) 5 MG CAPSULE    Take 5 mg by mouth every evening.      TRAZODONE (DESYREL) 50 MG TABLET    TK 1 T PO QD HS PRN   Discontinued Medications    AMLODIPINE (NORVASC) 5 MG TABLET    Take 5 mg by mouth once daily.    AZELASTINE (ASTELIN) 137 MCG (0.1 %) NASAL SPRAY    1 spray by Nasal route 2 (two) times daily.    CALCIUM CITRATE (CALCITRATE) 200 MG (950 MG) TABLET    Take 1 tablet by mouth once daily.    DIAZEPAM (VALIUM) 10 MG TAB        FERROUS SULFATE 325 MG (65 MG IRON) TAB TABLET        GUAIFENESIN-CODEINE 100-10 MG/5 ML (TUSSI-ORGANIDIN NR)  MG/5 ML SYRUP    Take 5 mLs by mouth 3 (three) times daily as needed for Cough.    LORAZEPAM (ATIVAN) 1 MG TABLET        LUPRON DEPOT, 3 MONTH, 22.5 MG INJECTION        METOPROLOL SUCCINATE (TOPROL-XL) 25 MG 24 HR TABLET    Take 25 mg by mouth.    MONTELUKAST (SINGULAIR) 10 MG TABLET    Take 10 mg by mouth every evening.    OMEPRAZOLE (PRILOSEC OTC) 20 MG TABLET    Take 20 mg by mouth.    PAROXETINE (PAXIL) 10 MG TABLET        PREGABALIN (LYRICA) 75 MG CAPSULE    Take 75 mg by mouth 2 (two) times daily.    QUETIAPINE (SEROQUEL) 25 MG TAB        TEMAZEPAM (RESTORIL) 7.5 MG CAP    Take 15 mg by mouth nightly as needed.     Review of patient's allergies indicates:  No Known Allergies   Social History     Tobacco Use    Smoking status: Former Smoker     Last attempt to quit: 10/22/2012     Years since quittin.2   Substance Use Topics    Alcohol use: Yes     Comment: Occasional beer      Family History   Problem Relation Age of Onset    Heart disease Father       Review of Systems  General:  No weight changes.  HEENT:  No visual field changes or hoarseness.  Neck:  No complaints.  Respiratory:  No shortness of breath or hemoptysis.  Cardiac:  No angina or palpitations.  GI:  No melena.  Musculoskeletal:  No significant arthralgias.  Neurologic:  He complains of intermittent dizziness.  He also continues with a burning  "discomfort in the upper left thigh    Objective: Physical Exam     Vitals:    02/04/20 1331   BP: (!) 159/74   Pulse: 70   Weight: 80.1 kg (176 lb 9.6 oz)   Height: 5' 10" (1.778 m)   PainSc:   4   PainLoc: Back       Objective    General:  Well-nourished, well-developed man in no obvious distress.  HEENT:  Normocephalic, atraumatic.  There is good facial symmetry.  Neck:  Trachea is midline.  There is no jugular venous distention.  Chest:  Lungs are clear bilaterally.  Heart:  Regular rate and rhythm with no murmurs or rubs.  Abdomen:  Soft.  No organomegaly. No bruit.  Extremities:  No cyanosis or edema.  Vascular:  2+ popliteal and femoral pulses bilaterally.    Data Review:   No results found for: WBC, HGB, HCT, MCV, PLT,   BMP   Lab Results   Component Value Date    CREATININE 1.3 01/08/2020    ESTGFRAFRICA >60 01/08/2020    EGFRNONAA 53 (A) 01/08/2020   ,   CMP  Creatinine   Date Value Ref Range Status   01/08/2020 1.3 0.5 - 1.4 mg/dL Final     eGFR if    Date Value Ref Range Status   01/08/2020 >60 >60 mL/min/1.73 m^2 Final     eGFR if non    Date Value Ref Range Status   01/08/2020 53 (A) >60 mL/min/1.73 m^2 Final     Comment:     Calculation used to obtain the estimated glomerular filtration  rate (eGFR) is the CKD-EPI equation.      ,   No results found for: INR, PROTIME    CT angiogram abdomen pelvis  CTA ABDOMEN AND PELVIS    CLINICAL HISTORY:  post endograft repair infrarenal abdominal aortic aneurysm;  Abdominal aortic aneurysm, without rupture    TECHNIQUE:  Transaxial CT angiogram images from the lung bases through the proximal thighs after the intra venous administration of 100 cc Omnipaque 350.  Multi planar reformats.    COMPARISON:  03/05/2019    FINDINGS:  Respiratory motion artifact in the lungs limits fine detail.  Prior pulmonary granulomatous disease.  There are several nodules in the right lung base, at least 3 in number with reference in the periphery of the " lower lobe measuring 8 mm series 2, image 44.  There also 2 pulmonary nodules in the left lower lobe periphery with reference measuring 6 mm series 2, image 63.  Normal size heart with calcification of the aortic valve and trace pericardial effusion.  Calcified left hilar lymph nodes suggest prior granulomatous disease.  Nondistended gallbladder.    Sequela of prior hepatic and splenic granulomatous disease.  In the left hepatic lobe near the gallbladder fossa there is a 1.5 cm enhancing lesion.  Mild pancreas atrophy.  Remaining solid abdominal organs unremarkable.    There is no enteric contrast which limits bowel assessment.  No dilated bowel loops.  Multiple colonic diverticula.  Normal appendix.    There is moderate aortic atherosclerosis.  In the infrarenal aorta, there is aneurysmal enlargement 4.7 x 4.5 cm in transaxial dimensions and 6.1 cm in length.  There is an aorto bi iliac stent.  There is no extraluminal contrast in the excluded aneurysm segment.  Normal size prostate.  Diffuse urinary bladder wall thickening.  Fat containing inguinal hernia.    Posterior instrumented fusion L3-S1, with periprosthetic lucency about the tip of the left L3 transpedicular screw size suggestive of loosening.  Posterior decompression L3-S1.      Impression       1. Fusiform aneurysm of the distal abdominal aorta with a spanning stent.  4.7 cm maximum dimension.  No evidence for endoleak.  2. Bibasilar pulmonary nodules.  Given size recommend repeat CT at 3-6 months.  Then depending on risk profile, additional CT could be considered at 18-24 months.  3. Enhancing hepatic lesion, possibly a hemangioma but overall indeterminate.  4.  Diffuse urinary bladder wall thickening with differential to include under distension, cystitis and chronic outlet obstruction.      Electronically signed by: Tyson Groves  Date: 01/08/2020  Time: 13:35            Last Resulted: 01/08/20 13:35                Assessment:     1.  History  endovascular repair infrarenal abdominal aortic aneurysm with no evidence of leak and no evidence of aneurysm enlargement.  2.  New lung nodule seen on base of lungs at CT scan of the abdomen.  3.  Essential hypertension treated medically.    Plan:     1.  Patient will follow with me with CT scan of the chest without contrast in 6 months in order to complete evaluation of the lung nodules.  2.  Patient will follow with ultrasound of the abdominal aorta in 1 year.

## 2020-09-04 ENCOUNTER — TELEPHONE (OUTPATIENT)
Dept: VASCULAR SURGERY | Facility: CLINIC | Age: 75
End: 2020-09-04

## 2020-09-04 NOTE — TELEPHONE ENCOUNTER
----- Message from Barbara Perez sent at 9/4/2020  4:05 PM CDT -----  Regarding: pls call pt regarding letter he recvd  Contact: Melvin callejas  Type: Needs Medical Advice  Who Called:  Melvin Roberto Call Back Number: [phone]  Additional Information: Pls call pt regarding letter he recv'd about the CT scan. He wants to know why is he taking this test

## 2020-09-08 ENCOUNTER — TELEPHONE (OUTPATIENT)
Dept: VASCULAR SURGERY | Facility: CLINIC | Age: 75
End: 2020-09-08

## 2021-03-02 ENCOUNTER — TELEPHONE (OUTPATIENT)
Dept: VASCULAR SURGERY | Facility: CLINIC | Age: 76
End: 2021-03-02

## 2021-03-02 DIAGNOSIS — I71.40 ABDOMINAL AORTIC ANEURYSM (AAA) WITHOUT RUPTURE: Primary | ICD-10-CM

## 2021-03-24 ENCOUNTER — TELEPHONE (OUTPATIENT)
Dept: RADIOLOGY | Facility: HOSPITAL | Age: 76
End: 2021-03-24

## 2021-03-26 ENCOUNTER — HOSPITAL ENCOUNTER (OUTPATIENT)
Dept: RADIOLOGY | Facility: HOSPITAL | Age: 76
Discharge: HOME | End: 2021-03-26
Attending: THORACIC SURGERY (CARDIOTHORACIC VASCULAR SURGERY)
Payer: MEDICARE

## 2021-03-26 DIAGNOSIS — I71.40 ABDOMINAL AORTIC ANEURYSM (AAA) WITHOUT RUPTURE: ICD-10-CM

## 2021-03-26 PROCEDURE — 76775 US ABDOMINAL AORTA: ICD-10-PCS | Mod: 26,,, | Performed by: RADIOLOGY

## 2021-03-26 PROCEDURE — 76775 US EXAM ABDO BACK WALL LIM: CPT | Mod: 26,,, | Performed by: RADIOLOGY

## 2021-03-26 PROCEDURE — 76775 US EXAM ABDO BACK WALL LIM: CPT | Mod: TC,PO

## 2021-03-30 ENCOUNTER — OFFICE VISIT (OUTPATIENT)
Dept: CARDIAC SURGERY | Facility: CLINIC | Age: 76
End: 2021-03-30
Payer: MEDICARE

## 2021-03-30 VITALS
SYSTOLIC BLOOD PRESSURE: 153 MMHG | WEIGHT: 169.63 LBS | DIASTOLIC BLOOD PRESSURE: 73 MMHG | HEART RATE: 69 BPM | HEIGHT: 70 IN | BODY MASS INDEX: 24.28 KG/M2

## 2021-03-30 DIAGNOSIS — R91.8 LUNG NODULES: Primary | ICD-10-CM

## 2021-03-30 PROCEDURE — 3077F PR MOST RECENT SYSTOLIC BLOOD PRESSURE >= 140 MM HG: ICD-10-PCS | Mod: CPTII,S$GLB,, | Performed by: THORACIC SURGERY (CARDIOTHORACIC VASCULAR SURGERY)

## 2021-03-30 PROCEDURE — 99999 PR PBB SHADOW E&M-EST. PATIENT-LVL III: CPT | Mod: PBBFAC,,, | Performed by: THORACIC SURGERY (CARDIOTHORACIC VASCULAR SURGERY)

## 2021-03-30 PROCEDURE — 3077F SYST BP >= 140 MM HG: CPT | Mod: CPTII,S$GLB,, | Performed by: THORACIC SURGERY (CARDIOTHORACIC VASCULAR SURGERY)

## 2021-03-30 PROCEDURE — 3288F FALL RISK ASSESSMENT DOCD: CPT | Mod: CPTII,S$GLB,, | Performed by: THORACIC SURGERY (CARDIOTHORACIC VASCULAR SURGERY)

## 2021-03-30 PROCEDURE — 3078F PR MOST RECENT DIASTOLIC BLOOD PRESSURE < 80 MM HG: ICD-10-PCS | Mod: CPTII,S$GLB,, | Performed by: THORACIC SURGERY (CARDIOTHORACIC VASCULAR SURGERY)

## 2021-03-30 PROCEDURE — 3078F DIAST BP <80 MM HG: CPT | Mod: CPTII,S$GLB,, | Performed by: THORACIC SURGERY (CARDIOTHORACIC VASCULAR SURGERY)

## 2021-03-30 PROCEDURE — 99213 PR OFFICE/OUTPT VISIT, EST, LEVL III, 20-29 MIN: ICD-10-PCS | Mod: S$GLB,,, | Performed by: THORACIC SURGERY (CARDIOTHORACIC VASCULAR SURGERY)

## 2021-03-30 PROCEDURE — 1101F PR PT FALLS ASSESS DOC 0-1 FALLS W/OUT INJ PAST YR: ICD-10-PCS | Mod: CPTII,S$GLB,, | Performed by: THORACIC SURGERY (CARDIOTHORACIC VASCULAR SURGERY)

## 2021-03-30 PROCEDURE — 1101F PT FALLS ASSESS-DOCD LE1/YR: CPT | Mod: CPTII,S$GLB,, | Performed by: THORACIC SURGERY (CARDIOTHORACIC VASCULAR SURGERY)

## 2021-03-30 PROCEDURE — 1159F PR MEDICATION LIST DOCUMENTED IN MEDICAL RECORD: ICD-10-PCS | Mod: S$GLB,,, | Performed by: THORACIC SURGERY (CARDIOTHORACIC VASCULAR SURGERY)

## 2021-03-30 PROCEDURE — 99999 PR PBB SHADOW E&M-EST. PATIENT-LVL III: ICD-10-PCS | Mod: PBBFAC,,, | Performed by: THORACIC SURGERY (CARDIOTHORACIC VASCULAR SURGERY)

## 2021-03-30 PROCEDURE — 1159F MED LIST DOCD IN RCRD: CPT | Mod: S$GLB,,, | Performed by: THORACIC SURGERY (CARDIOTHORACIC VASCULAR SURGERY)

## 2021-03-30 PROCEDURE — 1126F AMNT PAIN NOTED NONE PRSNT: CPT | Mod: S$GLB,,, | Performed by: THORACIC SURGERY (CARDIOTHORACIC VASCULAR SURGERY)

## 2021-03-30 PROCEDURE — 3288F PR FALLS RISK ASSESSMENT DOCUMENTED: ICD-10-PCS | Mod: CPTII,S$GLB,, | Performed by: THORACIC SURGERY (CARDIOTHORACIC VASCULAR SURGERY)

## 2021-03-30 PROCEDURE — 1126F PR PAIN SEVERITY QUANTIFIED, NO PAIN PRESENT: ICD-10-PCS | Mod: S$GLB,,, | Performed by: THORACIC SURGERY (CARDIOTHORACIC VASCULAR SURGERY)

## 2021-03-30 PROCEDURE — 99213 OFFICE O/P EST LOW 20 MIN: CPT | Mod: S$GLB,,, | Performed by: THORACIC SURGERY (CARDIOTHORACIC VASCULAR SURGERY)

## 2021-03-30 RX ORDER — FERROUS SULFATE 325(65) MG
1 TABLET ORAL DAILY
COMMUNITY
Start: 2021-02-19

## 2021-03-30 RX ORDER — SOLIFENACIN SUCCINATE 5 MG/1
5 TABLET, FILM COATED ORAL DAILY
COMMUNITY
Start: 2021-03-28

## 2022-02-02 ENCOUNTER — TELEPHONE (OUTPATIENT)
Dept: VASCULAR SURGERY | Facility: CLINIC | Age: 77
End: 2022-02-02
Payer: MEDICARE

## 2022-02-02 NOTE — TELEPHONE ENCOUNTER
----- Message from Lisa Valentin sent at 2/2/2022  2:26 PM CST -----  Contact: pt  Type: Needs Medical Advice    Who Called:  Pt  Best Call Back Number: [phone]    Additional Information: Requesting a call back regarding wanted to discuss appt when hes needing to come in for follow up  Please Advise ---Thank you

## 2022-02-04 NOTE — TELEPHONE ENCOUNTER
Dr Staton reviewed CTA done 9/5/21 @ [facility] which shows 4.1X3.8 AAA.  He recommends 1 year F/U abdominal ultrasound from that date

## 2022-09-14 ENCOUNTER — TELEPHONE (OUTPATIENT)
Dept: VASCULAR SURGERY | Facility: CLINIC | Age: 77
End: 2022-09-14

## 2022-09-14 NOTE — TELEPHONE ENCOUNTER
----- Message from Jann Avilez sent at 9/14/2022  2:17 PM CDT -----  Contact: pt at [phone]  Type: Needs Medical Advice  Who Called:  pt  Best Call Back Number: [phone]  Additional Information: pt is calling the office requesting a call back from the nurse. Please call back and advise.

## 2022-09-14 NOTE — TELEPHONE ENCOUNTER
----- Message from Jann Avilez sent at 9/14/2022  2:17 PM CDT -----  Contact: pt at [phone]  Type: Needs Medical Advice  Who Called:  pt  Best Call Back Number: [phone]  Additional Information: pt is calling the office requesting a call back from the nurse. Please call back and advise.

## 2022-11-01 ENCOUNTER — OFFICE VISIT (OUTPATIENT)
Dept: VASCULAR SURGERY | Facility: CLINIC | Age: 77
End: 2022-11-01
Payer: MEDICARE

## 2022-11-01 DIAGNOSIS — R91.8 LUNG NODULES: ICD-10-CM

## 2022-11-01 DIAGNOSIS — Z95.828 HISTORY OF ENDOVASCULAR STENT GRAFT FOR ABDOMINAL AORTIC ANEURYSM: Primary | ICD-10-CM

## 2022-11-01 DIAGNOSIS — I71.43 INFRARENAL ABDOMINAL AORTIC ANEURYSM (AAA) WITHOUT RUPTURE: ICD-10-CM

## 2022-11-01 PROCEDURE — 99441 PR PHYSICIAN TELEPHONE EVALUATION 5-10 MIN: ICD-10-PCS | Mod: 95,,, | Performed by: THORACIC SURGERY (CARDIOTHORACIC VASCULAR SURGERY)

## 2022-11-01 PROCEDURE — 1159F PR MEDICATION LIST DOCUMENTED IN MEDICAL RECORD: ICD-10-PCS | Mod: CPTII,95,, | Performed by: THORACIC SURGERY (CARDIOTHORACIC VASCULAR SURGERY)

## 2022-11-01 PROCEDURE — 1160F RVW MEDS BY RX/DR IN RCRD: CPT | Mod: CPTII,95,, | Performed by: THORACIC SURGERY (CARDIOTHORACIC VASCULAR SURGERY)

## 2022-11-01 PROCEDURE — 99441 PR PHYSICIAN TELEPHONE EVALUATION 5-10 MIN: CPT | Mod: 95,,, | Performed by: THORACIC SURGERY (CARDIOTHORACIC VASCULAR SURGERY)

## 2022-11-01 PROCEDURE — 1159F MED LIST DOCD IN RCRD: CPT | Mod: CPTII,95,, | Performed by: THORACIC SURGERY (CARDIOTHORACIC VASCULAR SURGERY)

## 2022-11-01 PROCEDURE — 1160F PR REVIEW ALL MEDS BY PRESCRIBER/CLIN PHARMACIST DOCUMENTED: ICD-10-PCS | Mod: CPTII,95,, | Performed by: THORACIC SURGERY (CARDIOTHORACIC VASCULAR SURGERY)

## 2022-11-01 NOTE — PROGRESS NOTES
Established Patient - Audio Only Telehealth Visit     The patient location is:  Home  The chief complaint leading to consultation is:  History of lung nodules an abdominal aneurysm  Visit type: Virtual visit with audio only (telephone)  Total time spent with patient:  5 minutes       The reason for the audio only service rather than synchronous audio and video virtual visit was related to technical difficulties or patient preference/necessity.     Each patient to whom I provide medical services by telemedicine is:  (1) informed of the relationship between the physician and patient and the respective role of any other health care provider with respect to management of the patient; and (2) notified that they may decline to receive medical services by telemedicine and may withdraw from such care at any time. Patient verbally consented to receive this service via voice-only telephone call.       HPI:  Patient is a 77-year-old man with history of abdominal aortic aneurysm repair as well as finding of some lung nodules which appeared inflammatory on previous CT scans.  He has undergone follow-up CT angiogram chest, abdomen, pelvis.  He denies new shortness of breath and denies abdominal/back pain.     Assessment and plan:           CT angiogram chest, abdomen, pelvis 10/26/2022  COMPARISON: 9/5/2021, as well as additional prior exams.     CHEST FINDINGS: Moderate atherosclerotic disease is present. The thoracic aorta is normal in caliber without evidence of aneurysm or dissection.  The great vessels are normal in appearance without evidence of focal stenosis.     ABDOMEN FINDINGS: There is moderate atherosclerotic disease. Patient is status post infrarenal aortobiiliac stent grafting. Abdominal aneurysm sac measures up to 3.3 cm AP by 3.5 cm transverse, decreasing from prior exam. There is no evidence of   endoleak. There is moderate stenosis of the proximal celiac trunk. There is moderate stenosis at the proximal SMA  secondary to calcified and noncalcified atherosclerotic plaque. There is mild to moderate stenosis at the bilateral renal artery ostia. The   proximal inferior mesenteric artery is occluded at its origin, however there is distal filling of the JUNIOR branches via SMA collaterals.     PELVIS FINDINGS: The common iliac, external iliac, and internal iliac arteries are patent without evidence of significant stenosis.  The common femoral arteries are patent without evidence of significant stenosis.     ADDITIONAL FINDINGS: Loop recorder is present. Small left fat-containing inguinal hernia is present. There is posterior fusion hardware within the lower lumbar spine. Patient is status post lumbar laminectomy. There is degenerative disc disease at L3-L4.   There is lucency surrounding the left L3 transpedicular screw, unchanged in appearance in comparison to prior exam.     There are calcified left hilar lymph nodes suggesting old granulomatous disease. There is moderate centrilobular and paraseptal emphysema.     Solid pulmonary nodule in the right lower lobe measures 6 mm (3-62), unchanged from July 2021 CT chest. There is a 3 mm solid pulmonary nodule along the right major fissure (3-36). There is a punctate subpleural pulmonary nodule in the right middle lobe.   There is scattered interstitial scarring within the bilateral lungs. There is a 5 mm solid pulmonary nodule in the left upper lobe (3-30), unchanged. There is a 6 mm solid pulmonary nodule in the subpleural left lower lobe (3-59). There is coarse   calcification within the left lower lobe. There is a 4 mm solid pulmonary nodule in the left upper lobe (3-22).     Scattered punctate calcifications within the splenic and hepatic parenchyma suggest old granulomatous disease. Cholecystectomy. Bilateral renal cortical atrophy.     There is extensive sigmoid diverticulosis without evidence of diverticulitis.     IMPRESSION:    1.  History of abdominal aortic aneurysm  status post aortobiiliac stent grafting. No evidence of endoleak. Aneurysm sac is smaller from prior exams.    2.  Moderate atherosclerotic disease, with moderate stenoses at the proximal celiac trunk, proximal SMA, and proximal bilateral renal arteries.   3. Status post lower lumbar spinal fusion and laminectomy. Left L3 transpedicular screw demonstrates adjacent lucency, which may represent loosening. Findings unchanged from prior exams.   4. Scattered pulmonary nodules, some of which can be seen retrospectively but others of which are obscured on the prior exams. Fleischner Society 2017 guidelines for management of incidentally detected solid pulmonary nodules in adults (does not apply to   patients younger than 35 years, CT lung cancer screening, patients with immunosuppression or patients with known primary cancer): Multiple solid nodules < 6 mm in diameter: For low risk patients no routine follow-up. For high risk patents optional CT at   12 months.   5. Extensive colonic diverticulosis without evidence of diverticulitis.   6. Additional incidental, age-related, and/or chronic findings, as described above.            Electronically signed by Darby Zheng MD on 10/26/2022 11:30 AM        I discussed the findings of CT scan with him.    PLAN:  1. I do not believe further follow-up for the chronic lung nodules is necessary.    2. Recommend yearly follow-up of the repaired abdominal aortic aneurysm with ultrasound.     This service was not originating from a related E/M service provided within the previous 7 days nor will  to an E/M service or procedure within the next 24 hours or my soonest available appointment.  Prevailing standard of care was able to be met in this audio-only visit.